# Patient Record
Sex: MALE | Race: OTHER | URBAN - METROPOLITAN AREA
[De-identification: names, ages, dates, MRNs, and addresses within clinical notes are randomized per-mention and may not be internally consistent; named-entity substitution may affect disease eponyms.]

---

## 2021-10-01 ENCOUNTER — OFFICE VISIT (OUTPATIENT)
Dept: INTERNAL MEDICINE CLINIC | Age: 31
End: 2021-10-01

## 2021-10-01 VITALS
DIASTOLIC BLOOD PRESSURE: 90 MMHG | WEIGHT: 189.4 LBS | OXYGEN SATURATION: 99 % | SYSTOLIC BLOOD PRESSURE: 140 MMHG | BODY MASS INDEX: 31.56 KG/M2 | HEART RATE: 78 BPM | HEIGHT: 65 IN

## 2021-10-01 DIAGNOSIS — R03.0 ELEVATED BLOOD PRESSURE READING IN OFFICE WITHOUT DIAGNOSIS OF HYPERTENSION: Primary | ICD-10-CM

## 2021-10-01 DIAGNOSIS — E66.09 CLASS 1 OBESITY DUE TO EXCESS CALORIES WITHOUT SERIOUS COMORBIDITY WITH BODY MASS INDEX (BMI) OF 31.0 TO 31.9 IN ADULT: ICD-10-CM

## 2021-10-01 DIAGNOSIS — Z13.220 SCREENING FOR CHOLESTEROL LEVEL: ICD-10-CM

## 2021-10-01 DIAGNOSIS — Z71.89 GRIEF COUNSELING: ICD-10-CM

## 2021-10-01 PROCEDURE — 99203 OFFICE O/P NEW LOW 30 MIN: CPT | Performed by: INTERNAL MEDICINE

## 2021-10-01 SDOH — ECONOMIC STABILITY: FOOD INSECURITY: WITHIN THE PAST 12 MONTHS, THE FOOD YOU BOUGHT JUST DIDN'T LAST AND YOU DIDN'T HAVE MONEY TO GET MORE.: NEVER TRUE

## 2021-10-01 SDOH — ECONOMIC STABILITY: FOOD INSECURITY: WITHIN THE PAST 12 MONTHS, YOU WORRIED THAT YOUR FOOD WOULD RUN OUT BEFORE YOU GOT MONEY TO BUY MORE.: NEVER TRUE

## 2021-10-01 ASSESSMENT — ENCOUNTER SYMPTOMS
ORTHOPNEA: 0
COLOR CHANGE: 0
BLOOD IN STOOL: 0
SINUS PAIN: 0
WHEEZING: 0
ABDOMINAL PAIN: 0
SHORTNESS OF BREATH: 0
BLURRED VISION: 0
CONSTIPATION: 0
COUGH: 0
CHEST TIGHTNESS: 0

## 2021-10-01 ASSESSMENT — SOCIAL DETERMINANTS OF HEALTH (SDOH): HOW HARD IS IT FOR YOU TO PAY FOR THE VERY BASICS LIKE FOOD, HOUSING, MEDICAL CARE, AND HEATING?: NOT HARD AT ALL

## 2021-10-01 ASSESSMENT — PATIENT HEALTH QUESTIONNAIRE - PHQ9
SUM OF ALL RESPONSES TO PHQ QUESTIONS 1-9: 0
SUM OF ALL RESPONSES TO PHQ9 QUESTIONS 1 & 2: 0
SUM OF ALL RESPONSES TO PHQ QUESTIONS 1-9: 0
2. FEELING DOWN, DEPRESSED OR HOPELESS: 0
SUM OF ALL RESPONSES TO PHQ QUESTIONS 1-9: 0
1. LITTLE INTEREST OR PLEASURE IN DOING THINGS: 0

## 2021-10-01 NOTE — PROGRESS NOTES
Sánchez Chandler (:  1990) is a 28 y.o. male,New patient, here for evaluation of the following chief complaint(s):  Established New Doctor (Father dx with cancer this year. bump on back of head,sores on tongue, stress to the point can't sleep.)         ASSESSMENT/PLAN:  1. Elevated blood pressure reading in office without diagnosis of hypertension  -     CBC Auto Differential; Future  -     Comprehensive Metabolic Panel; Future  -     TSH without Reflex; Future  2. Class 1 obesity due to excess calories without serious comorbidity with body mass index (BMI) of 31.0 to 31.9 in adult  3. Screening for cholesterol level  -     Lipid Panel; Future  4. Grief counseling  Reviewed with patient his symptomatology I think is reactionary grief is playing a major role since his father has been diagnosed with the cancer we discussed the warning signs and when to let me know if his symptoms do not improve    Patient  Blood pressure elevation in addition to his obesity and family history are concerning risk factors we discussed lifestyle modifications and weight loss he is was given also some literature    The patient does have a benign looking moles on his skin it was documented in the chart and will follow it up when he comes back in    Return in about 3 months (around 2022). Subjective   SUBJECTIVE/OBJECTIVE:    Lab Review   No results found for: NA, K, CO2, BUN, CREATININE, GLUCOSE, CALCIUM  No results found for: WBC, HGB, HCT, MCV, PLT  No results found for: CHOL, TRIG, HDL, LDLDIRECT    Vitals 6057   SYSTOLIC 961   DIASTOLIC 90   Pulse 78   SpO2 99   Weight 189 lb 6.4 oz   Height 5' 4.961\"   Body mass index 31.55 kg/m2       Hypertension  This is a new problem. The current episode started 1 to 4 weeks ago. Pertinent negatives include no anxiety, blurred vision, chest pain, headaches, malaise/fatigue, neck pain, orthopnea, palpitations, peripheral edema, PND, shortness of breath or sweats. Review of Systems   Constitutional: Negative for activity change, appetite change, fatigue, malaise/fatigue and unexpected weight change. HENT: Negative for congestion, ear pain and sinus pain. Eyes: Negative for blurred vision. Respiratory: Negative for cough, chest tightness, shortness of breath and wheezing. Cardiovascular: Negative for chest pain, palpitations, orthopnea and PND. Gastrointestinal: Negative for abdominal pain, blood in stool and constipation. Endocrine: Negative for cold intolerance, heat intolerance and polyuria. Genitourinary: Negative for dysuria, frequency and urgency. Musculoskeletal: Negative for arthralgias, myalgias and neck pain. Skin: Negative for color change and rash. Neurological: Negative for weakness and headaches. Hematological: Negative for adenopathy. Does not bruise/bleed easily. Psychiatric/Behavioral: Negative for agitation, dysphoric mood and sleep disturbance. Objective   Physical Exam  Constitutional:       General: He is not in acute distress. Appearance: Normal appearance. HENT:      Head: Normocephalic and atraumatic. Right Ear: Tympanic membrane normal.      Left Ear: Tympanic membrane normal.      Nose: Nose normal.   Eyes:      Extraocular Movements: Extraocular movements intact. Conjunctiva/sclera: Conjunctivae normal.      Pupils: Pupils are equal, round, and reactive to light. Neck:      Vascular: No carotid bruit. Cardiovascular:      Rate and Rhythm: Normal rate and regular rhythm. Pulses: Normal pulses. Heart sounds: No murmur heard. Pulmonary:      Effort: Pulmonary effort is normal. No respiratory distress. Breath sounds: Normal breath sounds. Abdominal:      General: Abdomen is flat. Bowel sounds are normal. There is no distension. Palpations: Abdomen is soft. Tenderness: There is no abdominal tenderness.    Musculoskeletal:         General: No swelling, tenderness or deformity. Cervical back: Normal range of motion and neck supple. No rigidity or tenderness. Right lower leg: No edema. Left lower leg: No edema. Lymphadenopathy:      Cervical: No cervical adenopathy. Skin:     Coloration: Skin is not jaundiced. Findings: No bruising, erythema or lesion. Neurological:      General: No focal deficit present. Mental Status: He is alert and oriented to person, place, and time. Cranial Nerves: No cranial nerve deficit. Motor: No weakness. Gait: Gait normal.     Media Information     Document Information    Wound      10/01/2021 15:37   Attached To: Office Visit on 10/1/21 with Bhaskar Salgado MD   Source Information    Bhaskar Salgado MD  [facility] [place]            This dictation was generated by voice recognition computer software. Although all attempts are made to edit the dictation for accuracy, there may be errors in the transcription that are not intended. An electronic signature was used to authenticate this note.     --Bhaskar Salgado MD

## 2021-10-01 NOTE — PATIENT INSTRUCTIONS
Patient Education        Índice de Health Net corporal: Instrucciones de cuidado  Body Mass Index: Care Instructions  Instrucciones de cuidado    El índice de masa corporal [facility] le permite saber si gonsalez peso aumenta gonsalez riesgo de tener problemas de [place]. Se obtiene a partir de juvenal fórmula que compara el peso con la altura. · Un IMC de menos de 18.5 se considera peso insuficiente. · Un [facility] de entre 18.5 y 24.9 se considera saludable. · Un [facility] de entre 25 y 29.9 se considera sobrepeso. · Un [facility] de 30 o más se considera obesidad. Si gonsalez [facility] está dentro de los [employer], significa que usted tiene grzegorz riesgo de problemas de radha relacionados con el peso. Si gonsalez [facility] está dentro de los límites de sobrepeso u obesidad, el riesgo de tener enfermedades relacionadas con el peso podría ser mayor. Estas enfermedades incluyen presión arterial karolyn, enfermedades cardíacas, ataque cerebral, artritis o dolor articular, y diabetes. Si gonsalez [facility] se encuentra dentro de los límites de peso insuficiente, es posible que usted tenga un mayor riesgo de tener problemas osvaldo fatiga, juvenal grzegorz protección (inmunidad) contra enfermedades, pérdida muscular, pérdida ósea, pérdida del madelaine y problemas hormonales. El [facility] es solo juvenal medida del riesgo de tener problemas de radha relacionados con [place]. Si no hace [place], no sigue juvenal dieta saludable, twin demasiado alcohol o [place] productos derivados del tabaco, el riesgo de tener problemas de radha podría ser Fidenc[employer]. La atención de seguimiento es juvenal parte clave de gonsalez tratamiento y seguridad. Asegúrese de hacer y acudir a todas las citas, y llame a gonsalez médico si está teniendo problemas. También es juvenal buena idea saber los resultados de melanie exámenes y mantener juvenal lista de los medicamentos que wiliam. ¿Cómo puede cuidarse en el hogar? · Siga hábitos de alimentación saludables.  [place] incluye comer abundantes frutas, verduras, granos integrales, proteínas magras y productos lácteos bajos en grasa. · Si jones médico lo recomienda, francisco javier más ejercicio. Caminar es juvenal buena opción. Poco a poco, aumente la distancia que camina cada día. Trate de hacer al menos 30 minutos de ejercicio la mayoría de los días de la [place]. · No fume. Fumar puede aumentar el riesgo de tener problemas de [place]. Si necesita ayuda para dejar de fumar, hable con jones médico sobre programas y medicamentos para dejar de fumar. Estos pueden aumentar melanie probabilidades de dejar de fumar para siempre. · Limite el alcohol a 2 bebidas al día si es hombre, y 1 bebida al día si es tommy. Demasiado alcohol puede causar problemas de radha. Si tiene un [facility] mayor de 25  · Jones médico puede solicitar otras pruebas para evaluar jones riesgo de tener problemas de radha relacionados con [place]. [place] podría incluir medir jones cintura. Si es hombre y jones cintura mide más de 40 pulgadas (1 m) o si es tommy y jones cintura mide más de 35 pulgadas (89 cm), el riesgo de tener problemas de radha relacionados con el peso puede [place]. · Hable con jones [place] medidas que puede alfred para mantenerse saludable o mejorar jones radha. Jony vez tenga que hacer cambios en jones estilo de cirilo para bajar peso y estar saludable, tales osvaldo cambiar jones alimentación y hacer ejercicio con regularidad. Si tiene un [facility] inferior a 18.5  · Jones médico puede hacerle otras pruebas para evaluar jones riesgo de tener problemas de [place]. · Hable con jones [place] medidas que puede alfred para mantenerse saludable o mejorar jones radha. Jony vez tenga que hacer cambios en jones estilo de cirilo para subir de peso y [place] saludable, tales osvaldo incluir más alimentos saludables en jones alimentación y hacer ejercicios para desarrollar los músculos. ¿Dónde puede encontrar más información en inglés? Vanessa Middleton a [email or web address] e ingrese a jones cuenta de MyChart.  Shaina Mcdonough YChapincito en el Nathan Euceda \"Search Health Information\" para más información (en inglés) sobre \"Índice de masa corporal: Instrucciones de cuidado. \"     Si no tiene juvenal cuenta, francisco javier chantaleic en el enlace \"Sign Up Now\". Revisado: 17 marzo, 2021               Versión del contenido: 13.0  © 2006-2021 Healthwise, Incorporated. Las instrucciones de cuidado fueron adaptadas bajo licencia por [facility] ([facility]). Si usted tiene [place] afección médica o sobre estas instrucciones, siempre pregunte a gonsalez profesional de radha. Healthwise, Incorporated niega toda garantía o responsabilidad por gonsalez uso de esta información. Patient Education        Presión arterial elevada: Instrucciones de cuidado - [ Elevated Blood Pressure: Care Instructions ]  Instrucciones de cuidado    La presión arterial es juvenal medida de la fuerza que ejerce la kateryna contra las beltrán de las arterias. Es normal que la presión arterial suba y baje a lo deepak del día. Roman si se mantiene karolyn por un tiempo, usted tiene presión arterial karolyn. Dos números indican gonsalez presión arterial. El primer número es la presión sistólica. Muestra qué tan janey presiona la kateryna cuando el corazón está bombeando. El abhishek número es la presión diastólica. Muestra qué tan janey presiona la [employer] latidos, cuando el corazón está relajado y llenándose de [place]. Romana Lively arterial ideal para adultos es menos de 120/80 (diga \"120 sobre 80\"). La presión arterial karolyn es de 140/90 o superior. Usted tiene la presión arterial karolyn si el número de [place] es 140 o superior o el número de abajo es 90 o superior, o ambas cosas. La prueba principal para la presión arterial karolyn es simple, rápida e indolora. Para diagnosticar presión arterial karolyn, gonsalez médico examinará gonsalez presión arterial en momentos diferentes. Después de tomarle la presión, es posible que gonsalez médico le pida que se vuelva a alfred la presión en casa. Si se le diagnostica presión arterial karolyn, puede colaborar con gonsalez médico para elaborar un plan a deepak plazo para manejarla.   [address] es juvenal parte clave de gonsalez tratamiento y seguridad. Asegúrese de hacer y acudir a todas las citas, y llame a gonsalez médico si está teniendo problemas. También es juvenal buena idea saber los resultados de melanie exámenes y mantener juvenal lista de los medicamentos que wiliam. ¿Cómo puede cuidarse en el hogar? · No fume. Fumar aumenta gonsalez riesgo de ataque cerebral y ataque al corazón. Si necesita ayuda para dejarlo, hable con gonsalez médico sobre programas y medicamentos para dejar de fumar. Estos pueden aumentar melanie probabilidades de dejar de fumar para siempre. · Mantenga un peso saludable. · Trate de limitar la cantidad de sodio que ingiere a menos de 2,300 miligramos (mg) al día. Gonsalez médico podría pedirle que trate de ingerir menos de 1,500 mg al día. · Manténgase físicamente activo. Ivan al menos 30 minutos de ejercicio la mayoría de los días de la [place]. Caminar es juvenal buena opción. Es posible que también quiera hacer otras actividades, osvaldo correr, nadar, [employer], o practicar tenis o deportes de equipo. · No tome alcohol o limite la cantidad que twin. Hable con gonsalez médico acerca de si puede alfred alcohol. · Coma abundantes frutas, verduras y productos lácteos bajos en grasa. Consuma menos grasa saturada y total.  · Aprenda a revisarse la presión arterial en gonsalez hogar. ¿Cuándo debe pedir ayuda? Llame a gonsalez médico ahora mismo o busque atención médica inmediata si:    · Gonsalez presión arterial es mucho más karolyn de lo normal (osvaldo 180/110 o superior).   · Nicole que la presión arterial karolyn está causando síntomas osvaldo:  ¨ Dolor de brigette intenso. ¨ Visión borrosa.    Vigile muy de cerca los cambios en gonsalez radha, y asegúrese de comunicarse con gonsalez médico si:    · No mejora osvaldo se esperaba. ¿Dónde puede encontrar más información en inglés? Peder Karen a [email or web address] e ingrese a gonsalez cuenta de MyChart.  Gabriel Patel I225 en el [facility] \"Search Health Information\" para más información (en inglés) Peace Rubi arterial elevada: Instrucciones de cuidado - [ Elevated Blood Pressure: Care Instructions ]. \"     Si no tiene juvenal cuenta, francisco javier radha en el enlace \"Sign Up Now\". Revisado: 10 piña, 2017  Versión del contenido: 11.6  © 2610-3229 Healthwise, Incorporated. Las instrucciones de cuidado fueron adaptadas bajo licencia por [facility] ([facility]). Si usted tiene [place] afección médica o sobre estas instrucciones, siempre pregunte a gonsalez profesional de radha. Healthwise, Incorporated niega toda garantía o responsabilidad por gonsalez uso de esta información. Patient Education        Aprenda acerca de las dietas bajas en carbohidratos  Learning About Low-Carbohydrate Diets  ¿Qué es juvenal dieta baja en carbohidratos? Juvenal dieta baja en carbohidratos, o dieta hipoglucídica, limita los alimentos y las bebidas que contienen carbohidratos. [place] incluye granos, frutas, D[place] Antes y yogur, y verduras con almidón osvaldo meño, frijoles y [address]. También toro los alimentos y las bebidas que contienen azúcar añadida. En gonsalez lugar, las dietas bajas en carbohidratos incluyen alimentos ricos en proteínas y grasas. ¿Por qué podría usted llevar juvenal dieta baja en carbohidratos? Jessie Gent bajas en carbohidratos se pueden usar por muchas razones, osvaldo para bajar de [place]. Las personas que tienen diabetes podrían usar juvenal dieta baja en carbohidratos para ayudar a controlar melanie niveles de azúcar en la kateryna. ¿Qué debe hacer antes de comenzar la dieta? Hable con gonsalez médico antes de probar cualquier dieta. [place] es aún más importante si tiene problemas de radha osvaldo enfermedad renal, enfermedad cardíaca o diabetes. Gonsalez médico puede sugerirle que consulte con un dietista registrado. Un dietista puede ayudarle a elaborar un plan de alimentación que le funcione a usted. ¿Qué alimentos se comen al seguir juvenal dieta baja en carbohidratos? Al seguir juvenal dieta baja en carbohidratos, usted opta por alimentos que maria elena ricos en proteínas y grasas.  [employer] ejemplos se incluyen los siguientes:  · [place], pescados y aves. · Huevos. · [place] variadas, osvaldo nueces de nogal, [facility], almendras y cacahuate [place]). · Mantequilla de cacahuate (maní) y otras mantequillas de nueces. · Tofu. · [place] (palta). · [place]. · Verduras sin almidón osvaldo brócoli, coliflor, judías verdes, champiñones, pimientos, lex y espinacas. · Leches no lácteas no azucaradas osvaldo la [address] [facility] y la [address] [place].  · [place], requesón y queso crema. Revisado: 17 diciembre, 2020               Versión del contenido: 13.0  © 2006-2021 Healthwise, Incorporated. Las instrucciones de cuidado fueron adaptadas bajo licencia por [facility]. Si usted tiene [place] afección médica o sobre estas instrucciones, siempre pregunte a gonsalez profesional de radha. Healthwise, Incorporated niega toda garantía o responsabilidad por gonsalez uso de esta información. Patient Education         [facility], la talla de cintura y gonsalez radha (subtitulado) (01:23)  BMI, Waist Size, and Your Health  Gonsalez profesional de la radha recomienda que cody alayna breve video de radha en [place]. Sepa cómo un [facility] y [facility] talla kun de cintura afectan gonsalez radha. Purpose:  Describes health problems related to high BMI and waist size. Goal:  The user will learn how a high BMI and waist size affect their health. Cómo mirar el video    Escanee el código QR   o visite el sitio web    [email or web address]   Revisado: 17 marzo, 2021               Versión del contenido: 13.0  © 2006-2021 Healthwise, Incorporated. Las instrucciones de cuidado fueron adaptadas bajo licencia por [facility]. Si usted tiene [place] afección médica o sobre estas instrucciones, siempre pregunte a gonsalez profesional de radha. HealthTelera, Incorporated niega toda garantía o responsabilidad por gonsalez uso de esta información. Patient Education        Cuando tiene sobrepeso:  Instrucciones de cuidado  When [employer] Overweight: Care Instructions  Instrucciones de cuidado    Si tiene sobrepeso, es posible que gonsalez médico le recomiende que francisco javier cambios en melanie hábitos alimenticios y de ejercicio. Tener sobrepeso puede causar problemas de radha graves, tales osvaldo presión arterial karolyn, enfermedades cardíacas, diabetes tipo 2 y artritis, o puede agravar estos problemas. [place] juvenal dieta saludable y ser más activo puede ayudarle a alcanzar y mantener un peso saludable. No tiene que [employer] cambios a la vez. Comience por hacer pequeños cambios en melanie hábitos alimenticios y de ejercicio. Para bajar de peso, necesita quemar más calorías de las que ingiere. Puede hacerlo comiendo alimentos saludables en cantidades razonables y volviéndose más activo todos los GRASSE. La atención de seguimiento es juvenal parte clave de gonsalez tratamiento y seguridad. Asegúrese de hacer y acudir a todas las citas, y llame a gonsalez médico si está teniendo problemas. También es juvenal buena idea saber los resultados de melanie exámenes y mantener juvenal lista de los medicamentos que wiliam. ¿Cómo puede cuidarse en el hogar? · Mejore melanie hábitos alimentarios. Usted tendrá más éxito si trabaja en el cambio de un hábito alimenticio a la vez. Todos los alimentos, si se comen con [place], pueden ser parte de juvenal alimentación saludable. Recuerde:  ? Coma juvenal variedad de alimentos de cada alex de alimentos. Incluya granos, verduras, frutas, lácteos y alimentos ricos en proteínas. ? Limite los alimentos altos en grasas, azúcares y calorías. ? Coma despacio. Y no francisco javier otra cosa, osvaldo alesia televisión, mientras esté comiendo. ? [address] porciones. Ponga gonsalez comida en un plato más pequeño. ? Planifique melanie comidas con anticipación. Tendrá menos probabilidades de coger algo que no es tan saludable. · Manténgase activo. La actividad regular puede ayudarle a sentirse mejor, tener más energía y [employer]. Si usted no ha sido activo, comience despacio. Comience con al menos 30 minutos de [place] moderada la mayoría de los días de la [place]. Luego aumente gradualmente la cantidad [facility]. Trate de hacer 60 a 90 minutos por día, por lo menos 5 días a la semana. Hay muchas maneras de [employer] en gonsalez cirilo. Usted puede:  ? Ir a la negrito caminando o en bicicleta. O caminar con un amigo, o pasear al sánchez. ? Cortar el césped, recoger las hojas, palear la mac o hacer algo de [place]. ? Usar las escaleras en lugar del elevador, al menos para algunos pisos. · Cambie gonsalez manera de pensar. Kerri pensamientos tienen mucho que alesia con cómo se siente y lo que hace. Cuando usted está tratando de alcanzar un peso saludable, cambiar gonsalez forma de pensar sobre ciertas cosas puede ayudar. Aquí tiene algunos consejos:  ? No se compare con los demás. Hay cuerpos saludables de todas las formas y de [place]. ? Preste atención a cuánta hambre tiene o cuán satisfecho se siente. Cuando coma, sea consciente de por qué está comiendo y de cuánto está comiendo. ? Enfóquese en mejorar gonsalez radha en vez de hacer dietas. Hacer dietas reji nunca da resultado a deepak plazo. · Pregúntele a gonsalez médico sobre otros profesionales de la radha que puedan ayudarle a alcanzar un peso saludable. ? Un dietista puede ayudarle a hacer cambios saludables en gonsalez dieta. ? Un especialista en ejercicio o entrenador personal pueden ayudarle a desarrollar un programa de ejercicio Felisha Bonnet y seguro. ? Un consejero o psiquiatra puede ayudarle a sobrellevar la depresión, la ansiedad o problemas familiares que puedan dificultar concentrarse en alcanzar un peso saludable. · Obtenga apoyo de gonsalez melissa, gonsalez médico, kerri amigos, un alex de apoyo; y apóyese usted mismo. ¿Dónde puede encontrar más información en inglés? Peder Karen a [email or web address] e ingrese a gonsalez cuenta de MyChart.  Gabriel Patel D416 en el [facility] \"Search Health Information\" para más información (en inglés) sobre \"Cuando tiene sobrepeso: Instrucciones de cuidado. \"     Si no tiene juvenal cuenta, francisco javier radha en el enlace \"Sign Up Now\". Revisado: 17 marzo, 2021               Versión del contenido: 13.0  © 7569-3001 Healthwise, Incorporated. Las instrucciones de cuidado fueron adaptadas bajo licencia por [facility] Si usted tiene [place] afección médica o sobre estas instrucciones, siempre pregunte a gonsaelz profesional de radha. Healthwise, Incorporated niega toda garantía o responsabilidad por gonsalez uso de esta información. Patient Education        Aprenda acerca de la obesidad  Learning About Obesity  ¿Qué es la obesidad? Obesidad significa tener un índice de masa corporal [facility]) de 30 o más. El [facility] es un número que se calcula a partir del peso y la estatura. ¿Cómo sabe si gonsalez peso se encuentra en el margen de obesidad? Para saber si gonsalez peso está en el margen de obesidad, gonsalez médico se fijará en gonsalez índice de masa corporal [facility]) y la talla de cintura. El [facility] es un número que se calcula a partir del peso y la estatura. Para calcular gonsalez [facility], puede utilizar juvenal herramienta en línea, osvaldo [email or web address] en el sitio web de los [employer] de [address] [place] [address] ([facility]). Si gonsalez [facility] es 30.0 o más, está dentro del [place] de obesidad. Tenga en cuenta que el [facility] y el tamaño de la cintura son solo Remus Boas. No son herramientas para determinar gonsalez peso ideal.  ¿Cuál es la causa de la obesidad? Cuando usted ingiere más calorías de las que [place], [place] de [place]. Cómo come, gonsalez nivel de [place] y otros factores afectan la Francois [employer] en que el cuerpo [place] las calorías y [place] que usted aumente o no de [place]. Si usted tiene familiares que tienen demasiada grasa corporal, es posible que haya heredado juvenal tendencia a subir de [place]. Y gonsalez melissa también ayuda a formar melanie hábitos alimentarios y de [place] de cirilo, que pueden conducir a la obesidad.   Además, nuestras vidas ocupadas hacen que resulte más difícil planificar y cocinar comidas saludables. Para muchos de nosotros, es más fácil recurrir a alimentos preparados, comer afuera o ir al \"drive-through\". Roman estas comidas a menudo tienen un alto contenido de grasas saturadas y calorías. A menudo, las porciones son demasiado grandes. ¿Qué puede hacer usted para alcanzar un peso saludable? Concéntrese en la radha, no en las dietas. Es difícil cumplir con dietas y, a la larga, no dan resultado. Es muy difícil cumplir con juvenal dieta que incluya juvenal gran cantidad de cambios importantes en melanie hábitos alimentarios. En lugar de concentrarse en juvenal dieta, concéntrese en los cambios en el estilo de cirilo que mejorarán gonsalez radha y alcanzarán el equilibrio adecuado [place] y calorías. Para bajar de peso, necesita gastar más calorías de las que ingiere. Puede hacerlo comiendo alimentos saludables en cantidades razonables y [address] [facility] sea solo un poco, todos [place]. Hacer cambios pequeños puede hacer juvenal gran diferencia con [place]. [place] un plan de cambio. Muchas personas calloway descubierto que identificar melanie razones para cambiar y [employer] concentrado en gonsalez plan puede hacer juvenal gran diferencia. [place] con gonsalez médico para crear un plan que sea adecuado para usted. · Pregúntese a sí mismo: \"¿Cuáles son mis motivos personales más importantes para querer alayna cambio? ¿Cómo será mi cirilo cuando haya hecho el cambio? · Robet Click gonsalez meta a deepak plazo. [place] específica, por ejemplo, \"Voy a bajar x libras o kilos\". · Divida gonsalez gran meta en metas más pequeñas, a corto plazo. [place] Juliana pequeños pasos específicos y dentro de gonsalez alcance, cosas que usted sabe que puede hacer. Estos pasos son lo que lo [employer] en gonsalez rumbo cada día. Hable con gonsalez médico acerca de otras opciones para bajar de [place].  Si tiene un [facility] dentro de ciertos límites y no ha podido adelgazar con [employer] y ejercicio, Anne-Marie Rummer opción para usted podrían ser los medicamentos o juvenal operación. Antes de que gonsalez médico le indique medicamentos o [place], él o gallo probablemente querrá que usted francisco javier más actividad y siga gonsalez plan de alimentación saludable por un período de tiempo. Estos hábitos son cambios de [place] de cirilo clave para manejar el peso, con o sin otro tratamiento médico. Y estos cambios pueden ayudarle a evitar problemas de radha relacionados con [employer]. ¿Cómo puede cumplir con gonsalez plan de cambio? Prepárese. Decida comenzar en juvenal época donde haya pocos eventos que pudieran provocar deslices, osvaldo los días de [place], eventos [place] y períodos de mucho estrés. Decida cuáles serán melanie primeros pasos. La mayoría de las personas tienen más éxito cuando hacen cambios pequeños, de a un paso por vez. Por ejemplo, podría cambiar gonsalez golosina diaria por juvenal fruta, caminar 10 minutos más, o agregar más verduras a juvenal comida. Seleccione a las personas que lo [facility] a apoyar. Asegúrese de no estar solo al [employer]. Conéctese con personas que entiendan lo importante que es para usted. Hable con melanie familiares y amigos para que le ayuden a respetar el plan. Y piense en quiénes podrían hacer que sea más difícil para usted, y cómo tratar con ellos. Pruebe a llevar un registro. Las personas que anotan lo que comen, sienten y [address] éxito para bajar de [place]. Pruebe a anotar cosas osvaldo:  · Laymond Smolder y cuánto come. · Cómo se siente antes y [address] de cada comida. · Detalles sobre cada comida (osvaldo comer afuera o en casa, comer solo o con amigos o melissa). · Qué hace para estar activo. Fíjese y planifique. A medida que anota estas cosas, fíjese en los hábitos que pudiera querer [employer]. [place] nota de lo siguiente:  · Cuándo come y si se salta comidas. · Con qué frecuencia come afuera. · Cuántas frutas y verduras come. · Cuándo come [address]. · Cuándo y por qué come por razones ajenas a tener hambre.   Si se desvía de gonsalez plan, no se enoje. [place] por qué tuvo el desliz y cómo puede corregirlo. ¿Puede alfred medicamentos u operarse para bajar de peso? Si tiene un [facility] dentro de ciertos límites y no ha podido adelgazar con Robert Munvitaliy y ejercicio, Tanner Ping opción para usted podrían ser los medicamentos o [facility]. Si tiene un IMC de al menos 30.0 (o un [facility] de al menos 27.0 y otro problema de radha relacionado con gonsalez peso), pregúntele a gonsalez médico sobre medicamentos para adelgazar. Estos actúan haciendo que usted sienta menos [place], haciendo que se sienta lleno con más rapidez o modificando cómo usted digiere las grasas. Los medicamentos se usan junto con cambios de alimentación y [place] f[employer] para ayudarle a hacer cambios duraderos. Si tiene un [facility] de 40.0 o más (o un [facility] de 35.0 o más y otro problema de radha relacionado con gonsalez peso), gonsalez médico podría hablar con usted acerca de operarse. La cirugía para adelgazar tiene riesgos y usted necesitará trabajar con gonsalez médico para comparar los riesgos de tener obesidad con los riesgos de [place]. Con cualquiera de las opciones que elija, usted todavía necesitará alimentarse de manera saludable y hacer ejercicio en forma regular. La atención de seguimiento es juvenal parte clave de gonsalez tratamiento y seguridad. Asegúrese de hacer y acudir a todas las citas, y llame a gonsalez médico si está teniendo problemas. También es juvenal buena idea saber los resultados de melanie exámenes y mantener juvenal lista de los medicamentos que wiliam. ¿Dónde puede encontrar más información en inglés? Brooke Temple a [email or web address] e ingrese a gonsalez cuenta de MyChart. Lashaun Ricci A701 en el Noberto Hinders \"Search Health Information\" para más información (en inglés) sobre \"Aprenda acerca de la obesidad. \"     Si no tiene juvenal cuenta, francisco javier radha en el enlace \"Sign Up Now\". Revisado: 17 marzo, 2021               Versión del contenido: 13.0  © 5005-5333 [employer].    Las instrucciones de cuidado fueron adaptadas bajo [place] por [facility]EF[facility] Si usted tiene [place] afección médica o sobre estas instrucciones, siempre pregunte a gonsalez profesional de radha. [facility] niega toda garantía o responsabilidad por gonsalez uso de esta información.